# Patient Record
Sex: FEMALE | Race: ASIAN | NOT HISPANIC OR LATINO | Employment: FULL TIME | URBAN - NONMETROPOLITAN AREA
[De-identification: names, ages, dates, MRNs, and addresses within clinical notes are randomized per-mention and may not be internally consistent; named-entity substitution may affect disease eponyms.]

---

## 2021-12-29 ENCOUNTER — HOSPITAL ENCOUNTER (EMERGENCY)
Facility: HOSPITAL | Age: 21
Discharge: HOME | End: 2021-12-30
Attending: EMERGENCY MEDICINE | Admitting: EMERGENCY MEDICINE

## 2021-12-29 VITALS
HEART RATE: 83 BPM | SYSTOLIC BLOOD PRESSURE: 141 MMHG | RESPIRATION RATE: 18 BRPM | OXYGEN SATURATION: 99 % | TEMPERATURE: 99.5 F | DIASTOLIC BLOOD PRESSURE: 99 MMHG

## 2021-12-29 DIAGNOSIS — L73.2 HIDRADENITIS SUPPURATIVA OF LEFT AXILLA: Primary | ICD-10-CM

## 2021-12-29 PROCEDURE — 99283 EMERGENCY DEPT VISIT LOW MDM: CPT

## 2021-12-29 NOTE — Clinical Note
Day Unger was seen and treated in our emergency department on 12/29/2021  Diagnosis:     Lloyd Cam  may return to work on return date  She may return on this date: 12/31/2021         If you have any questions or concerns, please don't hesitate to call        Ila Varela MD    ______________________________           _______________          _______________  Hospital Representative                              Date                                Time

## 2021-12-30 LAB
ALBUMIN SERPL BCP-MCNC: 3.2 G/DL (ref 3.5–5)
ALP SERPL-CCNC: 71 U/L (ref 46–116)
ALT SERPL W P-5'-P-CCNC: 20 U/L (ref 12–78)
ANION GAP SERPL CALCULATED.3IONS-SCNC: 12 MMOL/L (ref 4–13)
AST SERPL W P-5'-P-CCNC: 12 U/L (ref 5–45)
BASOPHILS # BLD AUTO: 0.03 THOUSANDS/ΜL (ref 0–0.1)
BASOPHILS NFR BLD AUTO: 0 % (ref 0–1)
BILIRUB SERPL-MCNC: 0.39 MG/DL (ref 0.2–1)
BUN SERPL-MCNC: 11 MG/DL (ref 5–25)
CALCIUM ALBUM COR SERPL-MCNC: 9.3 MG/DL (ref 8.3–10.1)
CALCIUM SERPL-MCNC: 8.7 MG/DL (ref 8.3–10.1)
CHLORIDE SERPL-SCNC: 99 MMOL/L (ref 100–108)
CO2 SERPL-SCNC: 26 MMOL/L (ref 21–32)
CREAT SERPL-MCNC: 0.81 MG/DL (ref 0.6–1.3)
EOSINOPHIL # BLD AUTO: 0.17 THOUSAND/ΜL (ref 0–0.61)
EOSINOPHIL NFR BLD AUTO: 2 % (ref 0–6)
ERYTHROCYTE [DISTWIDTH] IN BLOOD BY AUTOMATED COUNT: 12.6 % (ref 11.6–15.1)
GFR SERPL CREATININE-BSD FRML MDRD: 103 ML/MIN/1.73SQ M
GLUCOSE SERPL-MCNC: 87 MG/DL (ref 65–140)
HCG SERPL QL: NEGATIVE
HCT VFR BLD AUTO: 36.6 % (ref 34.8–46.1)
HGB BLD-MCNC: 11.8 G/DL (ref 11.5–15.4)
IMM GRANULOCYTES # BLD AUTO: 0.03 THOUSAND/UL (ref 0–0.2)
IMM GRANULOCYTES NFR BLD AUTO: 0 % (ref 0–2)
LYMPHOCYTES # BLD AUTO: 1.75 THOUSANDS/ΜL (ref 0.6–4.47)
LYMPHOCYTES NFR BLD AUTO: 18 % (ref 14–44)
MCH RBC QN AUTO: 29.1 PG (ref 26.8–34.3)
MCHC RBC AUTO-ENTMCNC: 32.2 G/DL (ref 31.4–37.4)
MCV RBC AUTO: 90 FL (ref 82–98)
MONOCYTES # BLD AUTO: 0.59 THOUSAND/ΜL (ref 0.17–1.22)
MONOCYTES NFR BLD AUTO: 6 % (ref 4–12)
NEUTROPHILS # BLD AUTO: 7 THOUSANDS/ΜL (ref 1.85–7.62)
NEUTS SEG NFR BLD AUTO: 74 % (ref 43–75)
NRBC BLD AUTO-RTO: 0 /100 WBCS
PLATELET # BLD AUTO: 344 THOUSANDS/UL (ref 149–390)
PMV BLD AUTO: 9.2 FL (ref 8.9–12.7)
POTASSIUM SERPL-SCNC: 3.7 MMOL/L (ref 3.5–5.3)
PROT SERPL-MCNC: 8.6 G/DL (ref 6.4–8.2)
RBC # BLD AUTO: 4.06 MILLION/UL (ref 3.81–5.12)
SODIUM SERPL-SCNC: 137 MMOL/L (ref 136–145)
WBC # BLD AUTO: 9.57 THOUSAND/UL (ref 4.31–10.16)

## 2021-12-30 PROCEDURE — 80053 COMPREHEN METABOLIC PANEL: CPT | Performed by: EMERGENCY MEDICINE

## 2021-12-30 PROCEDURE — 85025 COMPLETE CBC W/AUTO DIFF WBC: CPT | Performed by: EMERGENCY MEDICINE

## 2021-12-30 PROCEDURE — 84703 CHORIONIC GONADOTROPIN ASSAY: CPT | Performed by: EMERGENCY MEDICINE

## 2021-12-30 PROCEDURE — 36415 COLL VENOUS BLD VENIPUNCTURE: CPT | Performed by: EMERGENCY MEDICINE

## 2021-12-30 PROCEDURE — 99285 EMERGENCY DEPT VISIT HI MDM: CPT | Performed by: EMERGENCY MEDICINE

## 2021-12-30 RX ORDER — NAPROXEN 250 MG/1
500 TABLET ORAL ONCE
Status: COMPLETED | OUTPATIENT
Start: 2021-12-30 | End: 2021-12-30

## 2021-12-30 RX ORDER — CLINDAMYCIN HYDROCHLORIDE 150 MG/1
300 CAPSULE ORAL EVERY 8 HOURS SCHEDULED
Qty: 42 CAPSULE | Refills: 0 | Status: SHIPPED | OUTPATIENT
Start: 2021-12-30 | End: 2022-01-06

## 2021-12-30 RX ORDER — CLINDAMYCIN HYDROCHLORIDE 150 MG/1
300 CAPSULE ORAL ONCE
Status: COMPLETED | OUTPATIENT
Start: 2021-12-30 | End: 2021-12-30

## 2021-12-30 RX ORDER — NAPROXEN 500 MG/1
500 TABLET ORAL 2 TIMES DAILY WITH MEALS
Qty: 30 TABLET | Refills: 0 | Status: SHIPPED | OUTPATIENT
Start: 2021-12-30 | End: 2022-05-27

## 2021-12-30 RX ADMIN — NAPROXEN 500 MG: 250 TABLET ORAL at 01:56

## 2021-12-30 RX ADMIN — CLINDAMYCIN HYDROCHLORIDE 300 MG: 150 CAPSULE ORAL at 02:51

## 2021-12-30 NOTE — ED PROVIDER NOTES
History  Chief Complaint   Patient presents with    Abscess     Abscess under left arm  Started 2 weeks ago     50-year-old female with a past medical history of lump in left axilla presents with two lumps in left axilla x2 weeks  Patient denies previous history of abscess, MRSA or diagnosis of hidradenitis suppurativa  Patient states that last time she had similar lump that it drained spontaneously and resolved  Patient denies fever, chills, cellulitis, nausea, vomiting  Patient states she has tried warm compresses without improvement  Patient presents to the emergency department secondary to pain  No pain medications taken at home  No history of blood cancers or lymphoma  History provided by:  Patient and medical records   used: No    Abscess  Location:  Torso  Torso abscess location:  L axilla  Size:  2cm  Abscess quality: induration and painful    Red streaking: no    Duration:  2 weeks  Progression:  Unchanged  Pain details:     Quality:  Dull    Duration:  2 weeks    Timing:  Constant    Progression:  Unchanged  Chronicity:  Recurrent  Context: not diabetes, not immunosuppression, not injected drug use, not insect bite/sting and not skin injury    Relieved by:  Nothing  Worsened by:  Nothing  Ineffective treatments:  Warm compresses  Associated symptoms: no anorexia, no fever, no headaches, no nausea and no vomiting    Risk factors: no family hx of MRSA, no hx of MRSA and no prior abscess        None       History reviewed  No pertinent past medical history  History reviewed  No pertinent surgical history  History reviewed  No pertinent family history  I have reviewed and agree with the history as documented      E-Cigarette/Vaping     E-Cigarette/Vaping Substances     Social History     Tobacco Use    Smoking status: Never Smoker    Smokeless tobacco: Never Used   Substance Use Topics    Alcohol use: Not Currently    Drug use: Yes     Types: Marijuana     Comment: last week       Review of Systems   Constitutional: Negative  Negative for chills and fever  HENT: Negative  Eyes: Negative  Respiratory: Negative  Cardiovascular: Negative  Gastrointestinal: Negative  Negative for anorexia, nausea and vomiting  Endocrine: Negative  Genitourinary: Negative  Musculoskeletal: Positive for myalgias  Skin: Negative  Allergic/Immunologic: Negative  Neurological: Negative  Negative for headaches  Hematological: Negative  Psychiatric/Behavioral: Negative  All other systems reviewed and are negative  Physical Exam  Physical Exam  Vitals and nursing note reviewed  Exam conducted with a chaperone present  Constitutional:       General: She is not in acute distress  Appearance: Normal appearance  She is well-developed  She is obese  She is not ill-appearing, toxic-appearing or diaphoretic  Comments: Well appearing, in no acute distress   HENT:      Head: Normocephalic and atraumatic  Jaw: There is normal jaw occlusion  Right Ear: Hearing and external ear normal       Left Ear: Hearing and external ear normal       Nose: Nose normal       Right Nostril: No epistaxis  Left Nostril: No epistaxis  Mouth/Throat:      Lips: Pink  No lesions  Mouth: Mucous membranes are moist       Tongue: No lesions  Tongue does not deviate from midline  Palate: No mass and lesions  Pharynx: Oropharynx is clear  Uvula midline  Eyes:      General: Lids are normal  Vision grossly intact  Gaze aligned appropriately  No visual field deficit or scleral icterus  Right eye: No discharge  Left eye: No discharge  Extraocular Movements: Extraocular movements intact  Right eye: No nystagmus  Left eye: No nystagmus  Conjunctiva/sclera: Conjunctivae normal       Right eye: Right conjunctiva is not injected  Left eye: Left conjunctiva is not injected        Pupils: Pupils are equal, round, and reactive to light  Neck:      Thyroid: No thyroid mass or thyromegaly  Trachea: Trachea and phonation normal    Cardiovascular:      Rate and Rhythm: Normal rate and regular rhythm  Pulses: Normal pulses  Radial pulses are 2+ on the right side and 2+ on the left side  Dorsalis pedis pulses are 2+ on the right side and 2+ on the left side  Heart sounds: Normal heart sounds, S1 normal and S2 normal    Pulmonary:      Effort: Pulmonary effort is normal  No tachypnea, accessory muscle usage, respiratory distress or retractions  Breath sounds: Normal breath sounds and air entry  No stridor or decreased air movement  No decreased breath sounds, wheezing, rhonchi or rales  Chest:      Chest wall: No tenderness  Abdominal:      General: Abdomen is flat  Bowel sounds are normal  There is no distension  Palpations: Abdomen is soft  Abdomen is not rigid  There is no mass  Tenderness: There is no abdominal tenderness  There is no right CVA tenderness, left CVA tenderness, guarding or rebound  Negative signs include Marie's sign and McBurney's sign  Hernia: No hernia is present  Musculoskeletal:         General: Tenderness present  No swelling, deformity or signs of injury  Normal range of motion  Cervical back: Full passive range of motion without pain, normal range of motion and neck supple  No rigidity  No spinous process tenderness or muscular tenderness  Right lower leg: No edema  Left lower leg: No edema  Comments: Two lumps in left axilla ([id number], [id number]) that are indurated and tender to palpation; no drainage or bleeding; no erythema or warmth   Lymphadenopathy:      Cervical: No cervical adenopathy  Skin:     General: Skin is warm and dry  Capillary Refill: Capillary refill takes less than 2 seconds  Coloration: Skin is not ashen, cyanotic, jaundiced, mottled, pale or sallow        Findings: No abrasion, abscess, acne, bruising, burn, ecchymosis, erythema, signs of injury, laceration, lesion, petechiae, rash or wound  Rash is not macular or papular  Neurological:      General: No focal deficit present  Mental Status: She is alert and oriented to person, place, and time  Mental status is at baseline  She is not disoriented  GCS: GCS eye subscore is 4  GCS verbal subscore is 5  GCS motor subscore is 6  Cranial Nerves: Cranial nerves are intact  No cranial nerve deficit, dysarthria or facial asymmetry  Sensory: Sensation is intact  No sensory deficit  Motor: Motor function is intact  No weakness, tremor, atrophy, abnormal muscle tone or seizure activity  Coordination: Coordination is intact  Coordination normal       Gait: Gait is intact  Gait normal       Comments: Patient is AAOx4, GCS 15; speaking clearly and appropriately; motor and sensation intact; visual fields intact; cranial nerves II-XII grossly intact; no facial droop, slurred speech or arm drift   Psychiatric:         Attention and Perception: Attention and perception normal  She is attentive  Mood and Affect: Mood and affect normal          Speech: Speech normal          Behavior: Behavior normal  Behavior is cooperative  Thought Content:  Thought content normal          Cognition and Memory: Cognition and memory normal          Judgment: Judgment normal          Vital Signs  ED Triage Vitals [12/29/21 2153]   Temperature Pulse Respirations Blood Pressure SpO2   99 5 °F (37 5 °C) 83 18 141/99 99 %      Temp Source Heart Rate Source Patient Position - Orthostatic VS BP Location FiO2 (%)   Temporal Monitor Sitting Right arm --      Pain Score       9           Vitals:    12/29/21 2153   BP: 141/99   Pulse: 83   Patient Position - Orthostatic VS: Sitting         Visual Acuity      ED Medications  Medications   naproxen (NAPROSYN) tablet 500 mg (500 mg Oral Given 12/30/21 0156)   clindamycin (CLEOCIN) capsule 300 mg (300 mg Oral Given 12/30/21 0251)       Diagnostic Studies  Results Reviewed     Procedure Component Value Units Date/Time    hCG, qualitative pregnancy [[id number]]  (Normal) Collected: 12/30/21 0202    Lab Status: Final result Specimen: Blood from Arm, Left Updated: 12/30/21 0356     Preg, Serum Negative    Comprehensive metabolic panel [[id number]]  (Abnormal) Collected: 12/30/21 0202    Lab Status: Final result Specimen: Blood from Arm, Left Updated: 12/30/21 0222     Sodium 137 mmol/L      Potassium 3 7 mmol/L      Chloride 99 mmol/L      CO2 26 mmol/L      ANION GAP 12 mmol/L      BUN 11 mg/dL      Creatinine 0 81 mg/dL      Glucose 87 mg/dL      Calcium 8 7 mg/dL      Corrected Calcium 9 3 mg/dL      AST 12 U/L      ALT 20 U/L      Alkaline Phosphatase 71 U/L      Total Protein 8 6 g/dL      Albumin 3 2 g/dL      Total Bilirubin 0 39 mg/dL      eGFR 103 ml/min/1 73sq m     Narrative:      [place] guidelines for Chronic Kidney Disease (CKD):     Stage 1 with normal or high GFR (GFR > 90 mL/min/1 73 square meters)    Stage 2 Mild CKD (GFR = 60-89 mL/min/1 73 square meters)    Stage 3A Moderate CKD (GFR = 45-59 mL/min/1 73 square meters)    Stage 3B Moderate CKD (GFR = 30-44 mL/min/1 73 square meters)    Stage 4 Severe CKD (GFR = 15-29 mL/min/1 73 square meters)    Stage 5 End Stage CKD (GFR <15 mL/min/1 73 square meters)  Note: GFR calculation is accurate only with a steady state creatinine    CBC and differential [[id number]] Collected: 12/30/21 0202    Lab Status: Final result Specimen: Blood from Arm, Left Updated: 12/30/21 0207     WBC 9 57 Thousand/uL      RBC 4 06 Million/uL      Hemoglobin 11 8 g/dL      Hematocrit 36 6 %      MCV 90 fL      MCH 29 1 pg      MCHC 32 2 g/dL      RDW 12 6 %      MPV 9 2 fL      Platelets 599 Thousands/uL      nRBC 0 /100 WBCs      Neutrophils Relative 74 %      Immat GRANS % 0 %      Lymphocytes Relative 18 %      Monocytes Relative 6 %      Eosinophils Relative 2 %      Basophils Relative 0 %      Neutrophils Absolute 7 00 Thousands/µL      Immature Grans Absolute 0 03 Thousand/uL      Lymphocytes Absolute 1 75 Thousands/µL      Monocytes Absolute 0 59 Thousand/µL      Eosinophils Absolute 0 17 Thousand/µL      Basophils Absolute 0 03 Thousands/µL                  No orders to display              Procedures  POC MSK/Soft Tissue US    Date/Time: 12/30/2021 2:10 AM  Performed by: Rosa Garcia MD  Authorized by: Rosa Garcia MD     Patient location:  Bedside  Performed by: Attending  Procedure:     Performed: soft tissue ultrasound    Procedure details:     Exam Type:  Diagnostic    Longitudinal view:  Obtained    Transverse view:  Obtained    Image availability:  Not saved  Soft tissue ultrasound:     Soft tissue indications: swelling and pain      Anatomic location:  Axilla  Interpretation:     Soft tissue impressions: indeterminate               ED Course  ED Course as of 12/30/21 0402   Thu Dec 30, 2021   0400 Reassessed patient  Reviewed labs  Plan for discharge with primary care provider follow-up and Dermatology referral for suspected hydradenitis suppurativa  Advised patient to keep axilla clean, to minimize shaving, to use warm compresses, and tonight use deodorant  Prescriptions for clindamycin and naproxen provided  Work note provided  Strict return to ER precautions discussed with and acknowledged by patient         0400 Hcg negative       MDM  Number of Diagnoses or Management Options     Amount and/or Complexity of Data Reviewed  Clinical lab tests: ordered and reviewed  Tests in the medicine section of CPT®: ordered and reviewed  Review and summarize past medical records: yes    Risk of Complications, Morbidity, and/or Mortality  Presenting problems: moderate  Diagnostic procedures: moderate  Management options: moderate    Patient Progress  Patient progress: stable      Disposition  Final diagnoses:   Hidradenitis suppurativa of left axilla     Time reflects when diagnosis was documented in both MDM as applicable and the Disposition within this note     Time User Action Codes Description Comment    12/30/2021  2:11 AM Ryder Kumar Add [L73 2] Hidradenitis suppurativa of left axilla       ED Disposition     ED Disposition Condition Date/Time Comment    Discharge Stable Thu Dec 30, 2021  4:01 AM Isa Caikarlachiquita discharge to home/self care  Follow-up Information     Follow up With Specialties Details Why Contact Jermaine Douglas DO Dermatology Call in 1 day Please follow-up with dermatologist regarding your hidradenitis of your left armpit   Clarice Pena      Lizbet Jones MD Family Medicine Call in 1 day As needed, If symptoms worsen [address] [place] [address]  [phone]            Patient's Medications   Discharge Prescriptions    CLINDAMYCIN (CLEOCIN) 150 MG CAPSULE    Take 2 capsules (300 mg total) by mouth every 8 (eight) hours for 7 days       Start Date: 12/30/2021End Date: 1/6/2022       Order Dose: 300 mg       Quantity: 42 capsule    Refills: 0    NAPROXEN (NAPROSYN) 500 MG TABLET    Take 1 tablet (500 mg total) by mouth 2 (two) times a day with meals       Start Date: 12/30/2021End Date: --       Order Dose: 500 mg       Quantity: 30 tablet    Refills: 0           PDMP Review     None          ED Provider  Electronically Signed by    MD Shilpa Ayoub MD  12/30/21 5668

## 2022-05-27 ENCOUNTER — HOSPITAL ENCOUNTER (EMERGENCY)
Facility: HOSPITAL | Age: 22
Discharge: HOME | End: 2022-05-27
Attending: EMERGENCY MEDICINE | Admitting: EMERGENCY MEDICINE

## 2022-05-27 VITALS
TEMPERATURE: 98.5 F | WEIGHT: 229.28 LBS | RESPIRATION RATE: 18 BRPM | HEIGHT: 63 IN | SYSTOLIC BLOOD PRESSURE: 141 MMHG | HEART RATE: 105 BPM | OXYGEN SATURATION: 99 % | DIASTOLIC BLOOD PRESSURE: 62 MMHG | BODY MASS INDEX: 40.62 KG/M2

## 2022-05-27 DIAGNOSIS — L08.9 SOFT TISSUE INFECTION: Primary | ICD-10-CM

## 2022-05-27 PROCEDURE — 99284 EMERGENCY DEPT VISIT MOD MDM: CPT | Performed by: EMERGENCY MEDICINE

## 2022-05-27 PROCEDURE — 99283 EMERGENCY DEPT VISIT LOW MDM: CPT

## 2022-05-27 RX ORDER — CEPHALEXIN 250 MG/1
500 CAPSULE ORAL EVERY 12 HOURS SCHEDULED
Qty: 28 CAPSULE | Refills: 0 | Status: SHIPPED | OUTPATIENT
Start: 2022-05-27 | End: 2022-06-03

## 2022-05-27 RX ORDER — CEPHALEXIN 250 MG/1
500 CAPSULE ORAL ONCE
Status: COMPLETED | OUTPATIENT
Start: 2022-05-27 | End: 2022-05-27

## 2022-05-27 RX ORDER — ACETAMINOPHEN 325 MG/1
650 TABLET ORAL ONCE
Status: COMPLETED | OUTPATIENT
Start: 2022-05-27 | End: 2022-05-27

## 2022-05-27 RX ADMIN — CEPHALEXIN 500 MG: 250 CAPSULE ORAL at 11:03

## 2022-05-27 RX ADMIN — ACETAMINOPHEN 650 MG: 325 TABLET ORAL at 11:03

## 2022-05-27 NOTE — DISCHARGE INSTRUCTIONS
Thank you for letting us take care of you  You have been evaluated for possible abscess versus cyst   Please follow-up with dermatology  A referral has been placed  Please continue Tylenol and ibuprofen  Please use the antibiotics prescribed  At this time, you have no clinical evidence of symptoms or problems that will require hospitalization, however you should be evaluated soon by a primary care physician, and contact information has been provided  Follow up with your primary care physician  This is important as many medical conditions can be managed as an outpatient, in addition to routine health screening  Seeing your primary doctor often can help identify changes in the medical issue that brought you to the ED for care today  If you experience any new symptoms or acute worsening of current symptoms, please return to the ED

## 2022-05-27 NOTE — ED PROVIDER NOTES
History  Chief Complaint   Patient presents with    Abscess     Pt arrives reporting lump under left arm pit area x 1 week that is draining  Pt last took an aspirin at 0300 today  23-year-old female presents to the emergency department for evaluation of possible abscesses in the left axilla and left lower breast that have been ongoing for 1 week and reports NO draining (despite triage note stating otherwise)  Patient reports that she was told that she might have hidradenitis suppurativa but was never formally diagnosed  States that she took Tylenol (not ASA) at 3:00 a m  today  States she feels like there are multiple bumps over her axilla and 1 small bump over her breast   Denies shaving the areas  No fevers or chills currently  No other concerns  None       History reviewed  No pertinent past medical history  Past Surgical History:   Procedure Laterality Date    NECK SURGERY Right        History reviewed  No pertinent family history  I have reviewed and agree with the history as documented  E-Cigarette/Vaping     E-Cigarette/Vaping Substances     Social History     Tobacco Use    Smoking status: Never Smoker    Smokeless tobacco: Never Used   Substance Use Topics    Alcohol use: Not Currently    Drug use: Yes     Types: Marijuana     Comment: last week       Review of Systems   Constitutional: Negative for activity change, appetite change, chills and fever  HENT: Negative for congestion, rhinorrhea and sore throat  Eyes: Negative for visual disturbance  Respiratory: Negative for chest tightness, shortness of breath and wheezing  Cardiovascular: Negative for chest pain, palpitations and leg swelling  Gastrointestinal: Negative for abdominal pain, constipation, diarrhea, nausea and vomiting  Genitourinary: Negative for dysuria, flank pain and pelvic pain  Musculoskeletal: Negative for back pain and neck pain  Skin: Negative for rash          Reported indurated areas over the left axilla and over the breast on the lower side   Neurological: Negative for weakness, numbness and headaches  Psychiatric/Behavioral: Negative for behavioral problems  Physical Exam  Physical Exam  Vitals and nursing note reviewed  Constitutional:       General: She is not in acute distress  Appearance: She is well-developed  She is obese  She is not diaphoretic  HENT:      Head: Normocephalic and atraumatic  Right Ear: External ear normal       Left Ear: External ear normal       Nose: Nose normal    Eyes:      Pupils: Pupils are equal, round, and reactive to light  Cardiovascular:      Rate and Rhythm: Normal rate and regular rhythm  Heart sounds: Normal heart sounds  Pulmonary:      Effort: Pulmonary effort is normal  No respiratory distress  Breath sounds: Normal breath sounds  No wheezing or rales  Abdominal:      General: Bowel sounds are normal       Palpations: Abdomen is soft  Tenderness: There is no abdominal tenderness  Musculoskeletal:         General: No tenderness or deformity  Normal range of motion  Cervical back: Normal range of motion and neck supple  Skin:     General: Skin is warm and dry  Capillary Refill: Capillary refill takes less than 2 seconds  Comments: Left axilla with multiple indurated areas with no fluctuance notable; no erythema; no drainage notable; left breast with 1 cm area of induration without any fluctuance   Neurological:      Mental Status: She is alert and oriented to person, place, and time  Motor: No abnormal muscle tone           Vital Signs  ED Triage Vitals [05/27/22 0918]   Temperature Pulse Respirations Blood Pressure SpO2   98 5 °F (36 9 °C) 105 18 141/62 99 %      Temp Source Heart Rate Source Patient Position - Orthostatic VS BP Location FiO2 (%)   Temporal -- -- -- --      Pain Score       9           Vitals:    05/27/22 0918   BP: 141/62   Pulse: 105         Visual Acuity      ED Medications  Medications   cephalexin (KEFLEX) capsule 500 mg (has no administration in time range)   acetaminophen (TYLENOL) tablet 650 mg (has no administration in time range)       Diagnostic Studies  Results Reviewed     None                 No orders to display              Procedures  Procedures         ED Course           MDM  Number of Diagnoses or Management Options  Soft tissue infection  Diagnosis management comments: 69-year-old female presents to the emergency department for evaluation of possible abscesses in the left axilla and left lower breast that have been ongoing for 1 week and reports NO draining (despite triage note stating otherwise)  Patient reports that she was told that she might have hidradenitis suppurativa but was never formally diagnosed  Vital signs here non concerning  Patient has exam as above with indurated areas over the axilla and breast   We discussed options of treatment including incision and drainage attempt verses oral antibiotics and outpatient follow-up with dermatology  Patient elected for oral antibiotics  First dose provided here  Dermatology referral placed  Understands outpatient management plan  Tylenol provided here and patient was counseled to continue using NSAIDs and Tylenol at home  Return precautions provided         Amount and/or Complexity of Data Reviewed  Review and summarize past medical records: yes    Risk of Complications, Morbidity, and/or Mortality  Presenting problems: moderate  Diagnostic procedures: moderate  Management options: moderate        Disposition  Final diagnoses:   Soft tissue infection     Time reflects when diagnosis was documented in both MDM as applicable and the Disposition within this note     Time User Action Codes Description Comment    5/27/2022 10:51 AM Liza Lafleur Add [L08 9] Soft tissue infection       ED Disposition     ED Disposition   Discharge    Condition   Stable    Date/Time   Fri May 27, 2022 10:51 AM Comment   Isa Chavis discharge to home/self care                 Follow-up Information     Follow up With Specialties Details Why Contact Info    Dermatology              Patient's Medications   Discharge Prescriptions    CEPHALEXIN (KEFLEX) 250 MG CAPSULE    Take 2 capsules (500 mg total) by mouth every 12 (twelve) hours for 7 days       Start Date: 5/27/2022 End Date: 6/3/2022       Order Dose: 500 mg       Quantity: 28 capsule    Refills: 0           PDMP Review     None          ED Provider  Electronically Signed by           Katherine Mora MD  05/27/22 9680

## 2022-05-27 NOTE — Clinical Note
Bonnie Rizvi was seen and treated in our emergency department on 5/27/2022  Diagnosis:     Sanjeev Muñoz  may return to work on return date  She may return on this date: 05/28/2022         If you have any questions or concerns, please don't hesitate to call        Rusty Harman MD    ______________________________           _______________          _______________  Hospital Representative                              Date                                Time